# Patient Record
Sex: FEMALE | Race: WHITE | HISPANIC OR LATINO | Employment: UNEMPLOYED | URBAN - METROPOLITAN AREA
[De-identification: names, ages, dates, MRNs, and addresses within clinical notes are randomized per-mention and may not be internally consistent; named-entity substitution may affect disease eponyms.]

---

## 2017-03-27 ENCOUNTER — TELEPHONE (OUTPATIENT)
Dept: OBSTETRICS AND GYNECOLOGY | Facility: CLINIC | Age: 22
End: 2017-03-27

## 2017-03-27 DIAGNOSIS — Z30.09 GENERAL COUNSELING AND ADVICE ON FEMALE CONTRACEPTION: Primary | ICD-10-CM

## 2017-03-27 RX ORDER — DESOGESTREL AND ETHINYL ESTRADIOL 21-5 (28)
1 KIT ORAL DAILY
Qty: 28 TABLET | Refills: 11 | Status: SHIPPED | OUTPATIENT
Start: 2017-03-27 | End: 2017-04-17 | Stop reason: SDUPTHER

## 2017-03-27 NOTE — TELEPHONE ENCOUNTER
Discussed beginning new type combination OCP this Sunday, use backup condoms for the first 3 weeks. Will begin Mircette.

## 2017-03-27 NOTE — TELEPHONE ENCOUNTER
Pt says that she has been on a low-dose birth control (Ortho Micronor) since delivering in April of 2016 and that she would like to stop breast feeding and get on a higher dose of birth control. Told pt that I will send a message to Dr. Banks regarding her request. Pt communicated understanding. Sent message to Dr. Banks regarding pt's request.

## 2017-03-27 NOTE — TELEPHONE ENCOUNTER
----- Message from Milena Martinez LPN sent at 3/27/2017  1:12 PM CDT -----  Contact: patient  Pt desires to stop breast feeding and would like a prescription for a higher dose birth control. Please advise.

## 2017-03-27 NOTE — TELEPHONE ENCOUNTER
----- Message from Rajeev Dash sent at 3/27/2017 10:22 AM CDT -----  Contact: Pt  X_ 1st Request  _ 2nd Request  _ 3rd Request    Who:GINO HARO [[id number]]    Why: Patient states she needs to speak with staff in regards to getting her birth control switched back over due to no longer breast feeding    What Number to Call Back: [phone]    When to Expect a call back: (Before the end of the day)  -- if call after 3:00 call back will be tomorrow.

## 2017-04-17 ENCOUNTER — OFFICE VISIT (OUTPATIENT)
Dept: OBSTETRICS AND GYNECOLOGY | Facility: CLINIC | Age: 22
End: 2017-04-17
Attending: OBSTETRICS & GYNECOLOGY
Payer: OTHER GOVERNMENT

## 2017-04-17 VITALS
WEIGHT: 156.94 LBS | BODY MASS INDEX: 29.63 KG/M2 | DIASTOLIC BLOOD PRESSURE: 56 MMHG | HEIGHT: 61 IN | SYSTOLIC BLOOD PRESSURE: 104 MMHG

## 2017-04-17 DIAGNOSIS — Z01.419 ENCOUNTER FOR GYNECOLOGICAL EXAMINATION WITHOUT ABNORMAL FINDING: Primary | ICD-10-CM

## 2017-04-17 DIAGNOSIS — Z30.09 GENERAL COUNSELING AND ADVICE ON FEMALE CONTRACEPTION: ICD-10-CM

## 2017-04-17 DIAGNOSIS — Z87.42 HISTORY OF ABNORMAL CERVICAL PAP SMEAR: ICD-10-CM

## 2017-04-17 DIAGNOSIS — Z12.4 PAP SMEAR FOR CERVICAL CANCER SCREENING: ICD-10-CM

## 2017-04-17 PROCEDURE — 88175 CYTOPATH C/V AUTO FLUID REDO: CPT | Performed by: PATHOLOGY

## 2017-04-17 PROCEDURE — 87624 HPV HI-RISK TYP POOLED RSLT: CPT

## 2017-04-17 PROCEDURE — 99213 OFFICE O/P EST LOW 20 MIN: CPT | Mod: PBBFAC | Performed by: OBSTETRICS & GYNECOLOGY

## 2017-04-17 PROCEDURE — 88141 CYTOPATH C/V INTERPRET: CPT | Mod: ,,, | Performed by: PATHOLOGY

## 2017-04-17 PROCEDURE — 99999 PR PBB SHADOW E&M-EST. PATIENT-LVL III: CPT | Mod: PBBFAC,,, | Performed by: OBSTETRICS & GYNECOLOGY

## 2017-04-17 PROCEDURE — 99395 PREV VISIT EST AGE 18-39: CPT | Mod: S$PBB,,, | Performed by: OBSTETRICS & GYNECOLOGY

## 2017-04-17 RX ORDER — DESOGESTREL AND ETHINYL ESTRADIOL 21-5 (28)
1 KIT ORAL DAILY
Qty: 28 TABLET | Refills: 11 | Status: SHIPPED | OUTPATIENT
Start: 2017-04-17 | End: 2018-04-17

## 2017-04-17 NOTE — PROGRESS NOTES
Subjective:       Patient ID: Rogelio Cloud is a 21 y.o. female.    Chief Complaint:  Well Woman      History of Present Illness  HPI  Rogelio Cloud is a 21 y.o. female  here for her annual GYN exam.  (She will be 22 in less than one month, and has a history of an abnormal Pap during her first pregnancy and wants it repeated.  She describes her periods as has only had one period about 2-3 weeks ago since she has been breastfeeding her 11 month old infant, just started combination OCP's.  Denies break through bleeding.   Dnies vaginal itching or irritation.  Denies vaginal discharge.  She is sexually active. She has had 1 partner for the past 2 1/2 years .  She uses oral contraceptives (estrogen/progesterone) for contraception.   History of abnormal pap: Yes - With first pregnancy.  Last Pap: approximate date  and was abnormal: ?HPV  Denies domestic violence. She does feel safe at home.     Past Medical History:   Diagnosis Date    Abnormal Pap smear of cervix     Asthma     childhood    Menarche AGE 12     No past surgical history on file.  Social History     Social History    Marital status:      Spouse name: N/A    Number of children: N/A    Years of education: N/A     Occupational History    Not on file.     Social History Main Topics    Smoking status: Never Smoker    Smokeless tobacco: Never Used    Alcohol use No    Drug use: No    Sexual activity: Yes     Partners: Male     Birth control/ protection: None      Comment:  since      Other Topics Concern    Not on file     Social History Narrative     Family History   Problem Relation Age of Onset    Cancer Paternal Grandmother     Hypertension Paternal Grandmother     Diabetes Father     Hypertension Father     Cancer Paternal Grandfather      pancreatic    Colon cancer Neg Hx     Ovarian cancer Neg Hx     Stroke Neg Hx      OB History      Para Term  AB TAB SAB Ectopic Multiple Living    2 2 2    "   0 2          BP (!) 104/56  Ht 5' 1" (1.549 m)  Wt 71.2 kg (156 lb 15.5 oz)  LMP 03/15/2017 (Approximate)  Breastfeeding? No  BMI 29.66 kg/m2        GYN & OB History  Patient's last menstrual period was 03/15/2017 (approximate).   Date of Last Pap: No result found    OB History    Para Term  AB SAB TAB Ectopic Multiple Living   2 2 2      0 2      # Outcome Date GA Lbr Zenon/2nd Weight Sex Delivery Anes PTL Lv   2 Term 17 39w2d 02:57 / 00:28 3.41 kg (7 lb 8.3 oz) M Vag-Spont EPI N Y      Complications: Shoulder Dystocia   1 Term 14 38w3d 12:00 / 00:30 2.977 kg (6 lb 9 oz) M Vag-Spont EPI N Y          Review of Systems  Review of Systems   Constitutional: Negative for activity change, appetite change, fatigue and unexpected weight change.   HENT: Negative.    Eyes: Negative for visual disturbance.   Respiratory: Negative for shortness of breath and wheezing.    Cardiovascular: Negative for chest pain, palpitations and leg swelling.   Gastrointestinal: Negative for abdominal pain, bloating and blood in stool.   Endocrine: Negative for diabetes and hair loss.   Genitourinary: Negative for decreased libido, dyspareunia and postcoital bleeding.   Musculoskeletal: Negative for back pain and joint swelling.   Skin:  Negative for no acne and hair changes.   Neurological: Negative for headaches.   Hematological: Does not bruise/bleed easily.   Psychiatric/Behavioral: Negative for depression and sleep disturbance. The patient is not nervous/anxious.    Breast: Negative for breast pain and nipple discharge          Objective:    Physical Exam:   Constitutional: She is oriented to person, place, and time. She appears well-developed and well-nourished.    HENT:   Head: Normocephalic and atraumatic.    Eyes: EOM are normal. Pupils are equal, round, and reactive to light.    Neck: Normal range of motion. Neck supple.    Cardiovascular: Normal rate and regular rhythm.     Pulmonary/Chest: Effort " normal and breath sounds normal.   BREASTS: Symmetrical, no skin changes or visible lesions.  No palpable masses, nipple discharge bilaterally.          Abdominal: Soft. Bowel sounds are normal.     Genitourinary: Vagina normal. Pelvic exam was performed with patient supine.   Genitourinary Comments: PELVIC: Normal external genitalia without lesions.  Normal hair distribution.  Adequate perineal body, normal urethral meatus.  Vagina moist and well rugated without lesions or discharge.  Cervix pink, without lesions, discharge or tenderness.  No significant cystocele or rectocele.  Bimanual exam shows uterus to be normal size, regular, mobile and nontender.  Adnexa without masses or tenderness.               Musculoskeletal: Normal range of motion and moves all extremeties.       Neurological: She is alert and oriented to person, place, and time.    Skin: Skin is warm and dry.    Psychiatric: She has a normal mood and affect.          Assessment:        1. Encounter for gynecological examination without abnormal finding    2. Pap smear for cervical cancer screening    3. History of abnormal cervical Pap smear    4. General counseling and advice on female contraception               Plan:      1. Encounter for gynecological examination without abnormal finding  COUNSELING:  The patient was counseled today on regular weight bearing exercise. The patient was also counseled today on ACS PAP guidelines, with recommendations for yearly pelvic exams unless their uterus, cervix, and ovaries were removed for benign reasons; in that case, examinations every 3-5 years are recommended. The patient was also counseled regarding monthly breast self-examination, routine STD screening for at-risk populations, prophylactic immunizations for transmitted infections such as HPV, Pertussis, or Influenza as appropriate, and yearly mammograms when indicated by ACS guidelines. She was advised to see her primary care physician for all other  health maintenance.   FOLLOW-UP with me for next routine visit.         2. Pap smear for cervical cancer screening    - Liquid-based pap smear, screening    3. History of abnormal cervical Pap smear    - Liquid-based pap smear, screening    4. General counseling and advice on female contraception    - desog-e.estradiol/e.estradiol (KARIVA) 0.15-0.02 mgx21 /0.01 mg x 5 per tablet; Take 1 tablet by mouth once daily.  Dispense: 28 tablet; Refill: 11       Return in about 1 year (around 4/17/2018).

## 2017-04-24 LAB
HPV16 DNA SPEC QL NAA+PROBE: NEGATIVE
HPV16+18+H RISK 12 DNA CVX-IMP: POSITIVE
HPV18 DNA SPEC QL NAA+PROBE: NEGATIVE

## 2017-04-25 ENCOUNTER — TELEPHONE (OUTPATIENT)
Dept: OBSTETRICS AND GYNECOLOGY | Facility: CLINIC | Age: 22
End: 2017-04-25

## 2017-04-25 NOTE — TELEPHONE ENCOUNTER
Attempted to reach patient re: ASCUS pap with + HPV, but NOT HPV 16/18.  Will advise to just get a repeat Pap in one year.  No treatment needed.

## 2017-04-26 NOTE — TELEPHONE ENCOUNTER
Informed pt that her pap came back indicating that she has HPV but it is not HPV 16 or 18 which means that this does not appear to be the type of HPV that causes cervical cancer. Informed pt that Dr. Banks is recommending that she return in 1 yr for a repeat pap and that nothing further needs to be done at this time. Pt communicated understanding.

## 2017-04-26 NOTE — TELEPHONE ENCOUNTER
----- Message from Ros Teixeira sent at 4/26/2017  9:24 AM CDT -----  Contact: self  Patient is returning a missed call and can be reached at [phone]

## 2017-09-19 DIAGNOSIS — R10.11 RIGHT UPPER QUADRANT PAIN: Primary | ICD-10-CM

## 2017-09-25 ENCOUNTER — HOSPITAL ENCOUNTER (OUTPATIENT)
Dept: RADIOLOGY | Facility: HOSPITAL | Age: 22
Discharge: HOME | End: 2017-09-25
Attending: GENERAL PRACTICE
Payer: OTHER GOVERNMENT

## 2017-09-25 DIAGNOSIS — R10.11 RIGHT UPPER QUADRANT PAIN: ICD-10-CM

## 2017-09-25 PROCEDURE — 76705 ECHO EXAM OF ABDOMEN: CPT | Mod: TC

## 2017-09-25 PROCEDURE — 76705 ECHO EXAM OF ABDOMEN: CPT | Mod: 26,,, | Performed by: RADIOLOGY

## 2018-02-05 DIAGNOSIS — Z30.09 GENERAL COUNSELING AND ADVICE ON FEMALE CONTRACEPTION: ICD-10-CM

## 2018-02-05 RX ORDER — DESOGESTREL AND ETHINYL ESTRADIOL AND ETHINYL ESTRADIOL 21-5 (28)
KIT ORAL
Qty: 28 TABLET | Refills: 3 | Status: SHIPPED | OUTPATIENT
Start: 2018-02-05 | End: 2018-06-09 | Stop reason: SDUPTHER

## 2018-06-09 DIAGNOSIS — Z30.09 GENERAL COUNSELING AND ADVICE ON FEMALE CONTRACEPTION: ICD-10-CM

## 2018-06-11 RX ORDER — DESOGESTREL AND ETHINYL ESTRADIOL AND ETHINYL ESTRADIOL 21-5 (28)
KIT ORAL
Qty: 28 TABLET | Refills: 0 | Status: SHIPPED | OUTPATIENT
Start: 2018-06-11 | End: 2018-07-08 | Stop reason: SDUPTHER

## 2018-07-08 DIAGNOSIS — Z30.09 GENERAL COUNSELING AND ADVICE ON FEMALE CONTRACEPTION: ICD-10-CM

## 2018-07-09 RX ORDER — DESOGESTREL AND ETHINYL ESTRADIOL AND ETHINYL ESTRADIOL 21-5 (28)
KIT ORAL
Qty: 28 TABLET | Refills: 0 | Status: SHIPPED | OUTPATIENT
Start: 2018-07-09